# Patient Record
Sex: FEMALE | Race: WHITE | NOT HISPANIC OR LATINO
[De-identification: names, ages, dates, MRNs, and addresses within clinical notes are randomized per-mention and may not be internally consistent; named-entity substitution may affect disease eponyms.]

---

## 2018-11-21 PROBLEM — Z00.00 ENCOUNTER FOR PREVENTIVE HEALTH EXAMINATION: Status: ACTIVE | Noted: 2018-11-21

## 2018-12-06 ENCOUNTER — RECORD ABSTRACTING (OUTPATIENT)
Age: 83
End: 2018-12-06

## 2018-12-06 DIAGNOSIS — Z80.3 FAMILY HISTORY OF MALIGNANT NEOPLASM OF BREAST: ICD-10-CM

## 2018-12-06 DIAGNOSIS — Z87.11 PERSONAL HISTORY OF PEPTIC ULCER DISEASE: ICD-10-CM

## 2018-12-06 DIAGNOSIS — Z82.49 FAMILY HISTORY OF ISCHEMIC HEART DISEASE AND OTHER DISEASES OF THE CIRCULATORY SYSTEM: ICD-10-CM

## 2018-12-11 ENCOUNTER — APPOINTMENT (OUTPATIENT)
Dept: CARDIOLOGY | Facility: CLINIC | Age: 83
End: 2018-12-11
Payer: MEDICARE

## 2018-12-11 ENCOUNTER — LABORATORY RESULT (OUTPATIENT)
Age: 83
End: 2018-12-11

## 2018-12-11 VITALS
WEIGHT: 102 LBS | HEART RATE: 60 BPM | DIASTOLIC BLOOD PRESSURE: 60 MMHG | SYSTOLIC BLOOD PRESSURE: 122 MMHG | BODY MASS INDEX: 21.41 KG/M2 | HEIGHT: 58 IN

## 2018-12-11 PROCEDURE — 99213 OFFICE O/P EST LOW 20 MIN: CPT

## 2018-12-11 PROCEDURE — 93280 PM DEVICE PROGR EVAL DUAL: CPT

## 2018-12-11 RX ORDER — PANTOPRAZOLE 40 MG/1
40 TABLET, DELAYED RELEASE ORAL
Refills: 0 | Status: ACTIVE | COMMUNITY

## 2018-12-11 RX ORDER — SERTRALINE HYDROCHLORIDE 50 MG/1
50 TABLET, FILM COATED ORAL
Refills: 0 | Status: ACTIVE | COMMUNITY

## 2018-12-11 RX ORDER — B-COMPLEX WITH VITAMIN C
TABLET ORAL
Refills: 0 | Status: ACTIVE | COMMUNITY

## 2018-12-11 RX ORDER — PYRIDOSTIGMINE BROMIDE 60 MG/1
60 TABLET ORAL
Refills: 0 | Status: ACTIVE | COMMUNITY

## 2018-12-11 RX ORDER — LEVOTHYROXINE SODIUM 0.09 MG/1
88 TABLET ORAL
Refills: 0 | Status: ACTIVE | COMMUNITY

## 2018-12-11 NOTE — REVIEW OF SYSTEMS
[Recent Weight Gain (___ Lbs)] : no recent weight gain [Recent Weight Loss (___ Lbs)] : no recent weight loss [Shortness Of Breath] : no shortness of breath [Chest Pain] : no chest pain [Lower Ext Edema] : no extremity edema [Palpitations] : no palpitations [Cough] : no cough [Joint Pain] : no joint pain [Limb Weakness (Paresis)] : no limb weakness [Dizziness] : no dizziness [Easy Bleeding] : no tendency for easy bleeding [Easy Bruising] : no tendency for easy bruising

## 2018-12-11 NOTE — REASON FOR VISIT
[FreeTextEntry1] : Returns today for device check and fasting blood test.  There have been no issues or problems since visit with Dr. Rosas. Last month.  Denies chest pain, shortness of breath, or palpitations

## 2018-12-11 NOTE — ASSESSMENT
[FreeTextEntry1] : Pacemaker interrogation today with [employer] shows normal device function. 5 short episodes of mode switching.   battery life greater than 5 years patient's switched to our remote clinic.  Continue current treatment.

## 2018-12-11 NOTE — PHYSICAL EXAM
[Normal Appearance] : normal appearance [General Appearance - In No Acute Distress] : no acute distress [Auscultation Breath Sounds / Voice Sounds] : lungs were clear to auscultation bilaterally [Heart Sounds] : normal S1 and S2 [Abdomen Soft] : soft

## 2018-12-12 ENCOUNTER — CLINICAL ADVICE (OUTPATIENT)
Age: 83
End: 2018-12-12

## 2018-12-12 LAB
ALBUMIN SERPL ELPH-MCNC: 4.3 G/DL
ALP BLD-CCNC: 93 U/L
ALT SERPL-CCNC: 13 U/L
ANION GAP SERPL CALC-SCNC: 11 MMOL/L
AST SERPL-CCNC: 14 U/L
BILIRUB SERPL-MCNC: 0.3 MG/DL
BUN SERPL-MCNC: 43 MG/DL
CALCIUM SERPL-MCNC: 9.8 MG/DL
CHLORIDE SERPL-SCNC: 102 MMOL/L
CHOLEST SERPL-MCNC: 261 MG/DL
CHOLEST/HDLC SERPL: 4.1 RATIO
CO2 SERPL-SCNC: 28 MMOL/L
CREAT SERPL-MCNC: 2.59 MG/DL
GLUCOSE SERPL-MCNC: 78 MG/DL
HBA1C MFR BLD HPLC: 5.8 %
HDLC SERPL-MCNC: 63 MG/DL
LDLC SERPL CALC-MCNC: 166 MG/DL
POTASSIUM SERPL-SCNC: 4 MMOL/L
PROT SERPL-MCNC: 7.5 G/DL
SODIUM SERPL-SCNC: 141 MMOL/L
T4 FREE SERPL-MCNC: 1.6 NG/DL
TRIGL SERPL-MCNC: 159 MG/DL
TSH SERPL-ACNC: 0.58 UIU/ML

## 2018-12-13 ENCOUNTER — RX RENEWAL (OUTPATIENT)
Age: 83
End: 2018-12-13

## 2018-12-13 LAB
BASOPHILS # BLD AUTO: 0.05 K/UL
BASOPHILS NFR BLD AUTO: 0.8 %
EOSINOPHIL # BLD AUTO: 0.06 K/UL
EOSINOPHIL NFR BLD AUTO: 0.9 %
HCT VFR BLD CALC: 43.6 %
HGB BLD-MCNC: 13.8 G/DL
IMM GRANULOCYTES NFR BLD AUTO: 0.2 %
LYMPHOCYTES # BLD AUTO: 1.77 K/UL
LYMPHOCYTES NFR BLD AUTO: 27.4 %
MAN DIFF?: NORMAL
MCHC RBC-ENTMCNC: 24.3 PG
MCHC RBC-ENTMCNC: 31.7 GM/DL
MCV RBC AUTO: 76.6 FL
MONOCYTES # BLD AUTO: 0.45 K/UL
MONOCYTES NFR BLD AUTO: 7 %
NEUTROPHILS # BLD AUTO: 4.13 K/UL
NEUTROPHILS NFR BLD AUTO: 63.7 %
PLATELET # BLD AUTO: 191 K/UL
RBC # BLD: 5.69 M/UL
RBC # FLD: 23.8 %
WBC # FLD AUTO: 6.47 K/UL

## 2019-01-16 ENCOUNTER — APPOINTMENT (OUTPATIENT)
Dept: CARDIOLOGY | Facility: CLINIC | Age: 84
End: 2019-01-16

## 2019-02-12 DIAGNOSIS — F17.200 NICOTINE DEPENDENCE, UNSPECIFIED, UNCOMPLICATED: ICD-10-CM

## 2019-02-13 ENCOUNTER — APPOINTMENT (OUTPATIENT)
Dept: CARDIOLOGY | Facility: CLINIC | Age: 84
End: 2019-02-13
Payer: MEDICARE

## 2019-02-13 VITALS
WEIGHT: 115 LBS | SYSTOLIC BLOOD PRESSURE: 110 MMHG | HEART RATE: 60 BPM | HEIGHT: 58 IN | BODY MASS INDEX: 24.14 KG/M2 | DIASTOLIC BLOOD PRESSURE: 60 MMHG

## 2019-02-13 PROCEDURE — 99214 OFFICE O/P EST MOD 30 MIN: CPT

## 2019-02-13 PROCEDURE — 93000 ELECTROCARDIOGRAM COMPLETE: CPT

## 2019-02-13 RX ORDER — PENTOXIFYLLINE 400 MG/1
400 TABLET, EXTENDED RELEASE ORAL
Refills: 0 | Status: DISCONTINUED | COMMUNITY
End: 2019-02-13

## 2019-02-13 NOTE — HISTORY OF PRESENT ILLNESS
[FreeTextEntry1] : Lillian moved from [place] in November 2018 and is now followed here for her pacemaker.She is residing at the [facility]. She denies chest pain, dyspnea, palpitations or syncope.

## 2019-02-14 LAB
ANION GAP SERPL CALC-SCNC: 13 MMOL/L
BUN SERPL-MCNC: 39 MG/DL
CALCIUM SERPL-MCNC: 9.5 MG/DL
CHLORIDE SERPL-SCNC: 100 MMOL/L
CHOLEST SERPL-MCNC: 170 MG/DL
CHOLEST/HDLC SERPL: 2.6 RATIO
CO2 SERPL-SCNC: 28 MMOL/L
CREAT SERPL-MCNC: 2.58 MG/DL
GLUCOSE SERPL-MCNC: 87 MG/DL
HDLC SERPL-MCNC: 66 MG/DL
LDLC SERPL CALC-MCNC: 88 MG/DL
POTASSIUM SERPL-SCNC: 5 MMOL/L
SODIUM SERPL-SCNC: 141 MMOL/L
TRIGL SERPL-MCNC: 81 MG/DL

## 2019-02-28 ENCOUNTER — RX RENEWAL (OUTPATIENT)
Age: 84
End: 2019-02-28

## 2019-02-28 RX ORDER — ROSUVASTATIN CALCIUM 10 MG/1
10 TABLET, FILM COATED ORAL DAILY
Qty: 90 | Refills: 3 | Status: ACTIVE | COMMUNITY
Start: 2018-12-12 | End: 1900-01-01

## 2019-03-04 ENCOUNTER — RX RENEWAL (OUTPATIENT)
Age: 84
End: 2019-03-04

## 2019-03-07 ENCOUNTER — CLINICAL ADVICE (OUTPATIENT)
Age: 84
End: 2019-03-07

## 2019-04-08 ENCOUNTER — RESULT REVIEW (OUTPATIENT)
Age: 84
End: 2019-04-08

## 2019-04-09 ENCOUNTER — RESULT REVIEW (OUTPATIENT)
Age: 84
End: 2019-04-09

## 2019-04-29 RX ORDER — AMLODIPINE BESYLATE 2.5 MG/1
2.5 TABLET ORAL DAILY
Qty: 90 | Refills: 3 | Status: ACTIVE | COMMUNITY
Start: 2019-04-29 | End: 1900-01-01

## 2019-04-30 ENCOUNTER — APPOINTMENT (OUTPATIENT)
Dept: CARDIOLOGY | Facility: CLINIC | Age: 84
End: 2019-04-30
Payer: MEDICARE

## 2019-04-30 VITALS
HEART RATE: 62 BPM | OXYGEN SATURATION: 97 % | SYSTOLIC BLOOD PRESSURE: 110 MMHG | BODY MASS INDEX: 27.8 KG/M2 | DIASTOLIC BLOOD PRESSURE: 60 MMHG | WEIGHT: 133 LBS

## 2019-04-30 PROCEDURE — 99213 OFFICE O/P EST LOW 20 MIN: CPT

## 2019-04-30 PROCEDURE — 36415 COLL VENOUS BLD VENIPUNCTURE: CPT

## 2019-04-30 NOTE — ASSESSMENT
[FreeTextEntry1] : There is edema of lower extremities today, however, her lungs are clear and there are no complaints of shortness of breath. Blood drawn to evaluate kidney function.  Will hold off on diuretics until blood work is resulted.  Will review. Nephrology consult

## 2019-04-30 NOTE — PHYSICAL EXAM
[Normal Appearance] : normal appearance [General Appearance - In No Acute Distress] : no acute distress [Respiration, Rhythm And Depth] : normal respiratory rhythm and effort [Heart Sounds] : normal S1 and S2 [Auscultation Breath Sounds / Voice Sounds] : lungs were clear to auscultation bilaterally [Abdomen Soft] : soft [FreeTextEntry1] : slow gait, uses walker [Skin Color & Pigmentation] : normal skin color and pigmentation

## 2019-04-30 NOTE — REVIEW OF SYSTEMS
[Recent Weight Gain (___ Lbs)] : recent [unfilled] ~Ulb weight gain [Shortness Of Breath] : no shortness of breath [Dyspnea on exertion] : not dyspnea during exertion [Chest Pain] : no chest pain [Lower Ext Edema] : lower extremity edema [Palpitations] : no palpitations [Easy Bleeding] : no tendency for easy bleeding [Easy Bruising] : no tendency for easy bruising [Negative] : Respiratory

## 2019-04-30 NOTE — REASON FOR VISIT
[FreeTextEntry1] : Recent hospitalization for chest pain workup was negative. The Lasix was stopped during that admission for worsening renal function. She does have a history of renal insufficiency.  Daughter reports that she was evaluated by nephrology and also diagnosed with lung cancer and she will be seeing Dr. Dockery next week.  She is now on amlodipine 2.5 mg daily\par \par Reports some weight gain and edema of lower extremities. She is unable to keep the legs elevated. Denies shortness of breath. No orthopnea or PND

## 2019-05-01 LAB
ALBUMIN SERPL ELPH-MCNC: 4.2 G/DL
ALP BLD-CCNC: 100 U/L
ALT SERPL-CCNC: 13 U/L
ANION GAP SERPL CALC-SCNC: 17 MMOL/L
AST SERPL-CCNC: 15 U/L
BILIRUB SERPL-MCNC: 0.2 MG/DL
BUN SERPL-MCNC: 38 MG/DL
CALCIUM SERPL-MCNC: 9.1 MG/DL
CHLORIDE SERPL-SCNC: 104 MMOL/L
CO2 SERPL-SCNC: 21 MMOL/L
CREAT SERPL-MCNC: 2.63 MG/DL
GLUCOSE SERPL-MCNC: 71 MG/DL
POTASSIUM SERPL-SCNC: 5.6 MMOL/L
PROT SERPL-MCNC: 6.9 G/DL
SODIUM SERPL-SCNC: 142 MMOL/L

## 2019-05-03 RX ORDER — FUROSEMIDE 20 MG/1
20 TABLET ORAL DAILY
Qty: 90 | Refills: 1 | Status: DISCONTINUED | COMMUNITY
End: 2019-05-03

## 2019-05-19 ENCOUNTER — RESULT REVIEW (OUTPATIENT)
Age: 84
End: 2019-05-19

## 2019-05-20 ENCOUNTER — RESULT REVIEW (OUTPATIENT)
Age: 84
End: 2019-05-20

## 2019-06-13 ENCOUNTER — RESULT REVIEW (OUTPATIENT)
Age: 84
End: 2019-06-13

## 2019-07-09 ENCOUNTER — APPOINTMENT (OUTPATIENT)
Dept: CARDIOLOGY | Facility: CLINIC | Age: 84
End: 2019-07-09

## 2019-08-13 ENCOUNTER — APPOINTMENT (OUTPATIENT)
Dept: CARDIOLOGY | Facility: CLINIC | Age: 84
End: 2019-08-13

## 2019-08-15 ENCOUNTER — RESULT CHARGE (OUTPATIENT)
Age: 84
End: 2019-08-15

## 2019-10-10 ENCOUNTER — NON-APPOINTMENT (OUTPATIENT)
Age: 84
End: 2019-10-10

## 2019-10-10 ENCOUNTER — APPOINTMENT (OUTPATIENT)
Dept: CARDIOLOGY | Facility: CLINIC | Age: 84
End: 2019-10-10
Payer: MEDICARE

## 2019-10-10 VITALS
SYSTOLIC BLOOD PRESSURE: 110 MMHG | HEART RATE: 63 BPM | BODY MASS INDEX: 30.44 KG/M2 | WEIGHT: 145 LBS | DIASTOLIC BLOOD PRESSURE: 42 MMHG | HEIGHT: 58 IN

## 2019-10-10 DIAGNOSIS — E78.5 HYPERLIPIDEMIA, UNSPECIFIED: ICD-10-CM

## 2019-10-10 DIAGNOSIS — C34.90 MALIGNANT NEOPLASM OF UNSPECIFIED PART OF UNSPECIFIED BRONCHUS OR LUNG: ICD-10-CM

## 2019-10-10 DIAGNOSIS — Z95.0 PRESENCE OF CARDIAC PACEMAKER: ICD-10-CM

## 2019-10-10 DIAGNOSIS — I10 ESSENTIAL (PRIMARY) HYPERTENSION: ICD-10-CM

## 2019-10-10 DIAGNOSIS — R60.0 LOCALIZED EDEMA: ICD-10-CM

## 2019-10-10 DIAGNOSIS — G31.84 MILD COGNITIVE IMPAIRMENT, SO STATED: ICD-10-CM

## 2019-10-10 DIAGNOSIS — Z86.79 PERSONAL HISTORY OF OTHER DISEASES OF THE CIRCULATORY SYSTEM: ICD-10-CM

## 2019-10-10 DIAGNOSIS — G70.00 MYASTHENIA GRAVIS W/OUT (ACUTE) EXACERBATION: ICD-10-CM

## 2019-10-10 DIAGNOSIS — N18.9 CHRONIC KIDNEY DISEASE, UNSPECIFIED: ICD-10-CM

## 2019-10-10 PROCEDURE — 93000 ELECTROCARDIOGRAM COMPLETE: CPT

## 2019-10-10 PROCEDURE — 99214 OFFICE O/P EST MOD 30 MIN: CPT

## 2019-10-10 RX ORDER — GABAPENTIN 100 MG
100 TABLET ORAL 3 TIMES DAILY
Qty: 270 | Refills: 3 | Status: ACTIVE | COMMUNITY

## 2019-10-10 RX ORDER — FUROSEMIDE 40 MG/1
40 TABLET ORAL DAILY
Qty: 90 | Refills: 3 | Status: ACTIVE | COMMUNITY

## 2019-10-10 RX ORDER — DONEPEZIL HYDROCHLORIDE 5 MG/1
5 TABLET ORAL DAILY
Qty: 90 | Refills: 3 | Status: ACTIVE | COMMUNITY

## 2019-10-10 NOTE — HISTORY OF PRESENT ILLNESS
[FreeTextEntry1] : Lillian moved from [place] in November 2018 and is now followed here for her pacemaker.She is residing at the [facility]. \par Since last visit she was admitted with chest pain in april 2019 and diagnosed with lung adenocarcinoma.\par She was hospitalized  May 2019  with hip pain. She was treated for a  cellulitis recently (giraldo).\par She denies chest pain, dyspnea, palpitations or syncope.\par She sleeps a lot.\par She sees Dr Dockery, does not yet have a plan for treatment of the cancer.

## 2020-01-16 ENCOUNTER — APPOINTMENT (OUTPATIENT)
Dept: NEPHROLOGY | Facility: CLINIC | Age: 85
End: 2020-01-16

## 2020-02-11 ENCOUNTER — APPOINTMENT (OUTPATIENT)
Dept: CARDIOLOGY | Facility: CLINIC | Age: 85
End: 2020-02-11

## 2020-04-14 ENCOUNTER — APPOINTMENT (OUTPATIENT)
Dept: CARDIOLOGY | Facility: CLINIC | Age: 85
End: 2020-04-14

## 2021-10-06 PROBLEM — I10 ESSENTIAL HYPERTENSION: Status: ACTIVE | Noted: 2019-04-29
